# Patient Record
Sex: FEMALE | URBAN - METROPOLITAN AREA
[De-identification: names, ages, dates, MRNs, and addresses within clinical notes are randomized per-mention and may not be internally consistent; named-entity substitution may affect disease eponyms.]

---

## 2024-07-24 ENCOUNTER — TELEPHONE (OUTPATIENT)
Age: 34
End: 2024-07-24

## 2024-07-24 NOTE — TELEPHONE ENCOUNTER
Left PT message to call [facility] with insurance info needed for 8/7/24 office visit.

## 2024-08-05 NOTE — PROGRESS NOTES
Ambulatory Visit  Name: Sherron Rossi      : 1990      MRN: [id number]  Encounter Provider: Amanda Chan MD  Encounter Date: 2024   Encounter department: [facility] [place]    Assessment & Plan   1. Fissure in ano  -     NIFEdipine 0.3%-lidocaine 5% rectal ointment; Apply 1 Application topically 3 (three) times a day Apply a small amount to anal fissure  -     zinc oxide 20 % ointment; Apply topically as needed for irritation  Patient has intermittent fissure symptoms  She is requesting a refill on topical nifedipine, as this has helped her in the past, and she has an upcoming trip to [facility] and will be away for 6 months  We also discussed use of topical Calmoseptine as needed for perianal skin irritation  She may follow-up with our office as needed    History of Present Illness       Sherron Rossi is a 34 y.o. female who presents for a follow up for anal fissure. She is a new patient to [facility] but was previously seen at [facility] colorectal. Her last visit was on 23.    The patient reports that she still experiences some anal pain. She last experienced this last month but this resolved with the use of nifedipine lidocaine ointment.     She has daily bowel movements. The consistency of the stool varies. She denies rectal bleeding.     She has never had a colonoscopy procedure.     Review of Systems   Constitutional:  Negative for chills and fever.   HENT:  Negative for ear pain and sore throat.    Eyes:  Negative for pain and visual disturbance.   Respiratory:  Negative for cough and shortness of breath.    Cardiovascular:  Negative for chest pain and palpitations.   Gastrointestinal:  Negative for abdominal pain and vomiting.   Genitourinary:  Negative for dysuria and hematuria.   Musculoskeletal:  Negative for arthralgias and back pain.   Skin:  Negative for color change and rash.   Neurological:  Negative for seizures and syncope.   All other  systems reviewed and are negative.    Past Medical History   No past medical history on file.  No past surgical history on file.  No family history on file.  Current Outpatient Medications on File Prior to Visit   Medication Sig Dispense Refill    levothyroxine 88 mcg tablet Take 88 mcg by mouth daily       No current facility-administered medications on file prior to visit.   No Known Allergies   Objective     There were no vitals taken for this visit.    Physical Exam  Vitals and nursing note reviewed.   Constitutional:       General: She is not in acute distress.     Appearance: She is well-developed.   HENT:      Head: Normocephalic and atraumatic.   Eyes:      Conjunctiva/sclera: Conjunctivae normal.   Cardiovascular:      Rate and Rhythm: Normal rate and regular rhythm.      Heart sounds: No murmur heard.  Pulmonary:      Effort: Pulmonary effort is normal. No respiratory distress.      Breath sounds: Normal breath sounds.   Abdominal:      Palpations: Abdomen is soft.      Tenderness: There is no abdominal tenderness.   Genitourinary:     Comments: Mild perianal skin breakdown without excoriation or lesions  Small anterior and posterior midline skin tags  Strong tone on digital anorectal exam without tenderness  Musculoskeletal:         General: No swelling.      Cervical back: Neck supple.   Skin:     General: Skin is warm and dry.      Capillary Refill: Capillary refill takes less than 2 seconds.   Neurological:      Mental Status: She is alert.   Psychiatric:         Mood and Affect: Mood normal.       Administrative Statements   I have spent a total time of 36 minutes in caring for this patient on the day of the visit/encounter including Diagnostic results, Prognosis, Risks and benefits of tx options, Instructions for management, Patient and family education, Importance of tx compliance, Risk factor reductions, Impressions, Counseling / Coordination of care, Documenting in the medical record, Reviewing /  ordering tests, medicine, procedures  , Obtaining or reviewing history  , and Communicating with other healthcare professionals .

## 2024-08-07 ENCOUNTER — OFFICE VISIT (OUTPATIENT)
Age: 34
End: 2024-08-07
Payer: COMMERCIAL

## 2024-08-07 VITALS
DIASTOLIC BLOOD PRESSURE: 82 MMHG | BODY MASS INDEX: 29.53 KG/M2 | HEIGHT: 64 IN | SYSTOLIC BLOOD PRESSURE: 128 MMHG | WEIGHT: 173 LBS

## 2024-08-07 DIAGNOSIS — K60.2 FISSURE IN ANO: Primary | ICD-10-CM

## 2024-08-07 PROCEDURE — 99203 OFFICE O/P NEW LOW 30 MIN: CPT | Performed by: SURGERY

## 2024-08-07 RX ORDER — ZINC OXIDE 20 %
OINTMENT (GRAM) TOPICAL AS NEEDED
Qty: 28 G | Refills: 2 | Status: SHIPPED | OUTPATIENT
Start: 2024-08-07

## 2024-08-07 RX ORDER — LEVOTHYROXINE SODIUM 88 UG/1
88 TABLET ORAL DAILY
COMMUNITY
Start: 2024-06-21

## 2024-08-07 NOTE — LETTER
August 7, 2024     Patient: Sherron Rossi  YOB: 1990  Date of Visit: 8/7/2024      To Whom it May Concern:    Sherron Rossi is under my professional care. Sherron was seen in my office on 8/7/2024. Sherron may travel and should use 0.3% topical nifedipine three times per day and topical zinc three times per day as needed.    If you have any questions or concerns, please don't hesitate to call.         Sincerely,          Amanda Chan MD        CC: No Recipients

## 2024-08-07 NOTE — LETTER
August 7, 2024     Patient: Sherron Rossi  YOB: 1990  Date of Visit: 8/7/2024      To Whom it May Concern:    Sherron Rossi is under my professional care. Sherron was seen in my office on 8/7/2024. Sherron may travel and should use topical nifedipine three times per day and topical zinc three times per day as needed.    If you have any questions or concerns, please don't hesitate to call.         Sincerely,          Amanda Chan MD        CC: No Recipients